# Patient Record
Sex: FEMALE | Race: WHITE | NOT HISPANIC OR LATINO
[De-identification: names, ages, dates, MRNs, and addresses within clinical notes are randomized per-mention and may not be internally consistent; named-entity substitution may affect disease eponyms.]

---

## 2017-01-18 ENCOUNTER — RX RENEWAL (OUTPATIENT)
Age: 62
End: 2017-01-18

## 2017-05-09 ENCOUNTER — MEDICATION RENEWAL (OUTPATIENT)
Age: 62
End: 2017-05-09

## 2017-06-22 ENCOUNTER — MEDICATION RENEWAL (OUTPATIENT)
Age: 62
End: 2017-06-22

## 2017-07-10 ENCOUNTER — RX RENEWAL (OUTPATIENT)
Age: 62
End: 2017-07-10

## 2017-07-20 ENCOUNTER — RX RENEWAL (OUTPATIENT)
Age: 62
End: 2017-07-20

## 2017-08-09 ENCOUNTER — APPOINTMENT (OUTPATIENT)
Dept: ENDOCRINOLOGY | Facility: CLINIC | Age: 62
End: 2017-08-09
Payer: COMMERCIAL

## 2017-08-09 VITALS
DIASTOLIC BLOOD PRESSURE: 95 MMHG | WEIGHT: 194.44 LBS | HEIGHT: 63.7 IN | SYSTOLIC BLOOD PRESSURE: 181 MMHG | BODY MASS INDEX: 33.61 KG/M2 | HEART RATE: 108 BPM

## 2017-08-09 DIAGNOSIS — E04.2 NONTOXIC MULTINODULAR GOITER: ICD-10-CM

## 2017-08-09 PROCEDURE — 99214 OFFICE O/P EST MOD 30 MIN: CPT

## 2017-08-10 LAB
ANION GAP SERPL CALC-SCNC: 22 MMOL/L
BUN SERPL-MCNC: 16 MG/DL
CALCIUM SERPL-MCNC: 9.6 MG/DL
CHLORIDE SERPL-SCNC: 93 MMOL/L
CO2 SERPL-SCNC: 22 MMOL/L
CREAT SERPL-MCNC: 0.97 MG/DL
GLUCOSE SERPL-MCNC: 233 MG/DL
HBA1C MFR BLD HPLC: 7 %
POTASSIUM SERPL-SCNC: 4.6 MMOL/L
SODIUM SERPL-SCNC: 137 MMOL/L
TSH SERPL-ACNC: 3.21 UIU/ML

## 2017-10-12 ENCOUNTER — MEDICATION RENEWAL (OUTPATIENT)
Age: 62
End: 2017-10-12

## 2017-11-15 ENCOUNTER — RX RENEWAL (OUTPATIENT)
Age: 62
End: 2017-11-15

## 2017-12-01 ENCOUNTER — MEDICATION RENEWAL (OUTPATIENT)
Age: 62
End: 2017-12-01

## 2017-12-12 ENCOUNTER — MEDICATION RENEWAL (OUTPATIENT)
Age: 62
End: 2017-12-12

## 2018-01-07 ENCOUNTER — RX RENEWAL (OUTPATIENT)
Age: 63
End: 2018-01-07

## 2018-01-11 ENCOUNTER — MEDICATION RENEWAL (OUTPATIENT)
Age: 63
End: 2018-01-11

## 2018-02-08 ENCOUNTER — RX RENEWAL (OUTPATIENT)
Age: 63
End: 2018-02-08

## 2018-04-06 ENCOUNTER — RX RENEWAL (OUTPATIENT)
Age: 63
End: 2018-04-06

## 2018-04-08 ENCOUNTER — RX RENEWAL (OUTPATIENT)
Age: 63
End: 2018-04-08

## 2018-04-11 ENCOUNTER — APPOINTMENT (OUTPATIENT)
Dept: ENDOCRINOLOGY | Facility: CLINIC | Age: 63
End: 2018-04-11
Payer: COMMERCIAL

## 2018-04-11 VITALS
HEIGHT: 64 IN | WEIGHT: 190 LBS | SYSTOLIC BLOOD PRESSURE: 160 MMHG | BODY MASS INDEX: 32.44 KG/M2 | HEART RATE: 134 BPM | DIASTOLIC BLOOD PRESSURE: 88 MMHG

## 2018-04-11 PROCEDURE — 99214 OFFICE O/P EST MOD 30 MIN: CPT

## 2018-04-11 RX ORDER — GLIPIZIDE 10 MG/1
10 TABLET ORAL DAILY
Qty: 30 | Refills: 0 | Status: DISCONTINUED | COMMUNITY
Start: 2017-08-09 | End: 2018-04-11

## 2018-04-12 LAB
ALBUMIN SERPL ELPH-MCNC: 4.4 G/DL
ALP BLD-CCNC: 69 U/L
ALT SERPL-CCNC: 17 U/L
ANION GAP SERPL CALC-SCNC: 16 MMOL/L
AST SERPL-CCNC: 19 U/L
BILIRUB SERPL-MCNC: 0.4 MG/DL
BUN SERPL-MCNC: 18 MG/DL
CALCIUM SERPL-MCNC: 10 MG/DL
CHLORIDE SERPL-SCNC: 96 MMOL/L
CHOLEST SERPL-MCNC: 160 MG/DL
CHOLEST/HDLC SERPL: 2.8 RATIO
CO2 SERPL-SCNC: 26 MMOL/L
CREAT SERPL-MCNC: 1.07 MG/DL
CREAT SPEC-SCNC: 35 MG/DL
GLUCOSE SERPL-MCNC: 196 MG/DL
HBA1C MFR BLD HPLC: 6.4 %
HDLC SERPL-MCNC: 57 MG/DL
LDLC SERPL CALC-MCNC: 83 MG/DL
MICROALBUMIN 24H UR DL<=1MG/L-MCNC: 6.5 MG/DL
MICROALBUMIN/CREAT 24H UR-RTO: 186 MG/G
POTASSIUM SERPL-SCNC: 4.4 MMOL/L
PROT SERPL-MCNC: 7.6 G/DL
SODIUM SERPL-SCNC: 138 MMOL/L
TRIGL SERPL-MCNC: 99 MG/DL
TSH SERPL-ACNC: 2.43 UIU/ML

## 2018-04-13 ENCOUNTER — MEDICATION RENEWAL (OUTPATIENT)
Age: 63
End: 2018-04-13

## 2018-04-13 RX ORDER — LIRAGLUTIDE 6 MG/ML
18 INJECTION SUBCUTANEOUS
Qty: 1 | Refills: 5 | Status: DISCONTINUED | COMMUNITY
Start: 2017-12-12 | End: 2018-04-13

## 2018-05-03 ENCOUNTER — MEDICATION RENEWAL (OUTPATIENT)
Age: 63
End: 2018-05-03

## 2018-05-29 ENCOUNTER — MEDICATION RENEWAL (OUTPATIENT)
Age: 63
End: 2018-05-29

## 2018-06-05 ENCOUNTER — TRANSCRIPTION ENCOUNTER (OUTPATIENT)
Age: 63
End: 2018-06-05

## 2018-08-10 ENCOUNTER — RX RENEWAL (OUTPATIENT)
Age: 63
End: 2018-08-10

## 2018-08-28 ENCOUNTER — TRANSCRIPTION ENCOUNTER (OUTPATIENT)
Age: 63
End: 2018-08-28

## 2018-08-28 ENCOUNTER — MED ADMIN CHARGE (OUTPATIENT)
Age: 63
End: 2018-08-28

## 2018-10-26 ENCOUNTER — MEDICATION RENEWAL (OUTPATIENT)
Age: 63
End: 2018-10-26

## 2018-11-09 ENCOUNTER — APPOINTMENT (OUTPATIENT)
Dept: ENDOCRINOLOGY | Facility: CLINIC | Age: 63
End: 2018-11-09
Payer: COMMERCIAL

## 2018-11-09 VITALS
BODY MASS INDEX: 32.27 KG/M2 | HEART RATE: 117 BPM | WEIGHT: 189 LBS | SYSTOLIC BLOOD PRESSURE: 176 MMHG | DIASTOLIC BLOOD PRESSURE: 90 MMHG | HEIGHT: 64 IN

## 2018-11-09 PROCEDURE — 99214 OFFICE O/P EST MOD 30 MIN: CPT

## 2018-11-12 LAB
ANION GAP SERPL CALC-SCNC: 16 MMOL/L
BUN SERPL-MCNC: 16 MG/DL
CALCIUM SERPL-MCNC: 10 MG/DL
CHLORIDE SERPL-SCNC: 96 MMOL/L
CO2 SERPL-SCNC: 24 MMOL/L
CREAT SERPL-MCNC: 1.01 MG/DL
GLUCOSE SERPL-MCNC: 169 MG/DL
HBA1C MFR BLD HPLC: 6.3 %
POTASSIUM SERPL-SCNC: 4.5 MMOL/L
SODIUM SERPL-SCNC: 136 MMOL/L

## 2019-02-07 ENCOUNTER — MEDICATION RENEWAL (OUTPATIENT)
Age: 64
End: 2019-02-07

## 2019-04-08 ENCOUNTER — MEDICATION RENEWAL (OUTPATIENT)
Age: 64
End: 2019-04-08

## 2019-04-18 ENCOUNTER — MEDICATION RENEWAL (OUTPATIENT)
Age: 64
End: 2019-04-18

## 2019-05-07 ENCOUNTER — MEDICATION RENEWAL (OUTPATIENT)
Age: 64
End: 2019-05-07

## 2019-05-07 ENCOUNTER — TRANSCRIPTION ENCOUNTER (OUTPATIENT)
Age: 64
End: 2019-05-07

## 2019-05-10 ENCOUNTER — APPOINTMENT (OUTPATIENT)
Dept: ENDOCRINOLOGY | Facility: CLINIC | Age: 64
End: 2019-05-10
Payer: COMMERCIAL

## 2019-05-10 VITALS
WEIGHT: 187 LBS | BODY MASS INDEX: 31.92 KG/M2 | SYSTOLIC BLOOD PRESSURE: 148 MMHG | HEIGHT: 64 IN | DIASTOLIC BLOOD PRESSURE: 89 MMHG | HEART RATE: 104 BPM

## 2019-05-10 PROCEDURE — 99214 OFFICE O/P EST MOD 30 MIN: CPT

## 2019-05-10 NOTE — ASSESSMENT
[FreeTextEntry1] : Diabetes with multiple complications (retinopathy, nephropathy, neuropathy). \par if A1c still < 6.5%, will reduce Levemir to 10 units.  continue Trulicity, Humalog, metformin. \par recommended increasing exercise which will also help to reduce insulin requirements.  offered PT referral for improving balance, but she doesn't want to go to PT\par send for baseline bone density next visit, after she turns 64.\par RTO 6 months

## 2019-05-10 NOTE — HISTORY OF PRESENT ILLNESS
[FreeTextEntry1] : fell recently in living room, tripped over rug,  landed on her L knee\par no fractures\par am glucose 105-125.  160s post meal\par no hypoglycemia recently\par always has sinus issues, but no SOB or chest pain\par no neuropathy symptoms\par planning to start exercise "later this year"--  says she is waiting for balance to improve\par up to date with ophtho\par \par PMH: DM, retinopathy s/p multiple laser procedures, injections, vitrectomy\par proteinuria\par \par Meds:\par Levemir 12 units hs\par Humalog 10 units lunch and dinner, and with breakfast if having heavy breakfast\par Trulicity 1.5mg/week, metformin 1g bid\par quinapril 20mg/day, amlodipine 10mg, metoprolol 50mg bid\par atorvastatin 20mg\par Previous meds: invokana (yeast infx), Byetta (changed to Trulicity)\par \par ALL: penicillin (itching)

## 2019-05-10 NOTE — DATA REVIEWED
[FreeTextEntry1] : 11/18: A1c 6.3%, Cr 1.01\par 4/18: A1c 6.4%, Cr 1.07, tot chol 160, trig 99, HDL 57, LDL 83, TSH 2.43, urine microalb/cr 186\par 8/17: A1c 7.0%, TSH 3.21\par 11/16: A1c 7.1%, tot chol 205, trig 136, HDL 61, , urine microalb/cr 488\par

## 2019-05-10 NOTE — PHYSICAL EXAM
[Alert] : alert [Healthy Appearance] : healthy appearance [Normal Voice/Communication] : normal voice communication [No Proptosis] : no proptosis [Normal Hearing] : hearing was normal [No Lid Lag] : no lid lag [Clear to Auscultation] : lungs were clear to auscultation bilaterally [No LAD] : no lymphadenopathy [Normal S1, S2] : normal S1 and S2 [No Edema] : there was no peripheral edema [Regular Rhythm] : with a regular rhythm [Normal Sensation on Monofilament Testing] : normal sensation on monofilament testing of lower extremities [Normal Affect] : the affect was normal [Normal Mood] : the mood was normal [Foot Ulcers] : no foot ulcers [Acanthosis Nigricans] : no acanthosis nigricans [de-identified] : thyroid palpable [de-identified] : 3/6 systolic murmur, regular tachy [de-identified] : reduced pulses [de-identified] : no hair growth on LE, (+) discoloration [de-identified] : hammertoes, bunion L foot

## 2019-05-13 LAB
ALBUMIN SERPL ELPH-MCNC: 4.8 G/DL
ALP BLD-CCNC: 76 U/L
ALT SERPL-CCNC: 20 U/L
ANION GAP SERPL CALC-SCNC: 15 MMOL/L
AST SERPL-CCNC: 19 U/L
BILIRUB SERPL-MCNC: 0.3 MG/DL
BUN SERPL-MCNC: 16 MG/DL
CALCIUM SERPL-MCNC: 10.5 MG/DL
CHLORIDE SERPL-SCNC: 94 MMOL/L
CHOLEST SERPL-MCNC: 162 MG/DL
CHOLEST/HDLC SERPL: 2.9 RATIO
CO2 SERPL-SCNC: 26 MMOL/L
CREAT SERPL-MCNC: 0.93 MG/DL
CREAT SPEC-SCNC: 50 MG/DL
ESTIMATED AVERAGE GLUCOSE: 131 MG/DL
GLUCOSE SERPL-MCNC: 171 MG/DL
HBA1C MFR BLD HPLC: 6.2 %
HDLC SERPL-MCNC: 56 MG/DL
LDLC SERPL CALC-MCNC: 87 MG/DL
MICROALBUMIN 24H UR DL<=1MG/L-MCNC: 7.3 MG/DL
MICROALBUMIN/CREAT 24H UR-RTO: 146 MG/G
POTASSIUM SERPL-SCNC: 4.7 MMOL/L
PROT SERPL-MCNC: 7.5 G/DL
SODIUM SERPL-SCNC: 135 MMOL/L
TRIGL SERPL-MCNC: 95 MG/DL
TSH SERPL-ACNC: 2.95 UIU/ML

## 2019-06-05 ENCOUNTER — MEDICATION RENEWAL (OUTPATIENT)
Age: 64
End: 2019-06-05

## 2019-06-21 ENCOUNTER — APPOINTMENT (OUTPATIENT)
Dept: INTERNAL MEDICINE | Facility: CLINIC | Age: 64
End: 2019-06-21
Payer: COMMERCIAL

## 2019-06-21 VITALS
HEART RATE: 146 BPM | DIASTOLIC BLOOD PRESSURE: 80 MMHG | OXYGEN SATURATION: 97 % | SYSTOLIC BLOOD PRESSURE: 130 MMHG | RESPIRATION RATE: 14 BRPM | TEMPERATURE: 98 F | HEIGHT: 64 IN

## 2019-06-21 DIAGNOSIS — R35.0 FREQUENCY OF MICTURITION: ICD-10-CM

## 2019-06-21 PROCEDURE — 99215 OFFICE O/P EST HI 40 MIN: CPT

## 2019-06-21 NOTE — PHYSICAL EXAM
[Well Nourished] : well nourished [No Acute Distress] : no acute distress [Well Developed] : well developed [Normal Oropharynx] : the oropharynx was normal [No Respiratory Distress] : no respiratory distress  [No Lymphadenopathy] : no lymphadenopathy [Supple] : supple [No Accessory Muscle Use] : no accessory muscle use [Clear to Auscultation] : lungs were clear to auscultation bilaterally [Normal Rate] : normal rate  [Regular Rhythm] : with a regular rhythm [Normal S1, S2] : normal S1 and S2 [No Edema] : there was no peripheral edema [Normal Gait] : normal gait [Normal Affect] : the affect was normal [Normal Insight/Judgement] : insight and judgment were intact

## 2019-06-21 NOTE — HISTORY OF PRESENT ILLNESS
[de-identified] : 64 yo f with DM, diabetic retinopathy, HTN, HLD, \par Has not been seen in 3 years\par Taking medications regularly but just ran out of metoprolol.  \par Stopped following with a cardiologist. \par Last A1C of 6.2%.  \par no mammo or colonoscopy - did not get them done.  \par Has urinary frequency and stress incontinence.  \par

## 2019-06-26 ENCOUNTER — MEDICATION RENEWAL (OUTPATIENT)
Age: 64
End: 2019-06-26

## 2019-07-01 ENCOUNTER — MEDICATION RENEWAL (OUTPATIENT)
Age: 64
End: 2019-07-01

## 2019-07-29 ENCOUNTER — TRANSCRIPTION ENCOUNTER (OUTPATIENT)
Age: 64
End: 2019-07-29

## 2019-09-16 ENCOUNTER — MEDICATION RENEWAL (OUTPATIENT)
Age: 64
End: 2019-09-16

## 2019-09-23 ENCOUNTER — MEDICATION RENEWAL (OUTPATIENT)
Age: 64
End: 2019-09-23

## 2019-11-04 ENCOUNTER — MEDICATION RENEWAL (OUTPATIENT)
Age: 64
End: 2019-11-04

## 2019-11-08 ENCOUNTER — APPOINTMENT (OUTPATIENT)
Dept: ENDOCRINOLOGY | Facility: CLINIC | Age: 64
End: 2019-11-08
Payer: COMMERCIAL

## 2019-11-08 VITALS
WEIGHT: 183 LBS | DIASTOLIC BLOOD PRESSURE: 109 MMHG | BODY MASS INDEX: 31.41 KG/M2 | SYSTOLIC BLOOD PRESSURE: 184 MMHG | HEART RATE: 109 BPM

## 2019-11-08 PROCEDURE — 99214 OFFICE O/P EST MOD 30 MIN: CPT

## 2019-11-08 NOTE — PHYSICAL EXAM
[Alert] : alert [Normal Voice/Communication] : normal voice communication [No Proptosis] : no proptosis [Healthy Appearance] : healthy appearance [Normal Hearing] : hearing was normal [No Lid Lag] : no lid lag [No LAD] : no lymphadenopathy [Normal S1, S2] : normal S1 and S2 [Clear to Auscultation] : lungs were clear to auscultation bilaterally [Regular Rhythm] : with a regular rhythm [No Edema] : there was no peripheral edema [Normal Sensation on Monofilament Testing] : normal sensation on monofilament testing of lower extremities [Normal Affect] : the affect was normal [Normal Mood] : the mood was normal [Foot Ulcers] : no foot ulcers [Acanthosis Nigricans] : no acanthosis nigricans [de-identified] : thyroid palpable [de-identified] : 3/6 systolic murmur, regular tachy [de-identified] : reduced pulses [de-identified] : hammertoes, bunion L foot [de-identified] : no hair growth on LE, (+) discoloration

## 2019-11-08 NOTE — HISTORY OF PRESENT ILLNESS
[FreeTextEntry1] : glucose 109 today am.  testing 1-3x/day\par am range usually < 120 but will be 10mg/dl higher when she wakes up later.\par no polyuria, polydipsia, SOB, chest pain, or neuropathy symptoms \par no hypoglycemia symptoms\par up to date with ophtho, saw 5 months ago\par \par PMH: DM, retinopathy s/p multiple laser procedures, injections, vitrectomy\par proteinuria\par \par Meds:\par Levemir 10 units hs\par Humalog 14 units lunch and 10 for breakfast and  dinner\par Trulicity 1.5mg/week, metformin 1g bid\par quinapril 20mg/day, amlodipine 10mg, metoprolol 50mg bid\par atorvastatin 20mg\par Previous meds: invokana (yeast infx), Byetta (changed to Trulicity)\par \par ALL: penicillin (itching)

## 2019-11-08 NOTE — ASSESSMENT
[FreeTextEntry1] : Diabetes with multiple complications (retinopathy, nephropathy, neuropathy). \par if A1c still < 6.5%, will discontinue Levemir and monitor sugars.  If morning sugars stay below 130, then can stay off Levemir.  But should restart if am glucose consistently over 130-140.\par continue Trulicity and metformin. She is changing over to Medicare, which will cover her med cost, but she may fall into "donut hole" toward end of the year.\par RTo 6 months

## 2019-11-08 NOTE — DATA REVIEWED
[FreeTextEntry1] : 5/19: a1c 6.2%, tot chol 162, trig 95, HDL 56, LDL 87, TSH 2.95, urine microalbumin/cr 146, Cr 0.93, GFR 65\par 11/18: A1c 6.3%, Cr 1.01\par 4/18: A1c 6.4%, Cr 1.07, tot chol 160, trig 99, HDL 57, LDL 83, TSH 2.43, urine microalb/cr 186\par 8/17: A1c 7.0%, TSH 3.21\par 11/16: A1c 7.1%, tot chol 205, trig 136, HDL 61, , urine microalb/cr 488\par \par thyroid sono, 11/14:\par heterogeneous with bilateral sub-cm nodules (3-6mm)

## 2019-11-11 LAB
ANION GAP SERPL CALC-SCNC: 19 MMOL/L
BUN SERPL-MCNC: 16 MG/DL
CALCIUM SERPL-MCNC: 10.2 MG/DL
CHLORIDE SERPL-SCNC: 95 MMOL/L
CO2 SERPL-SCNC: 20 MMOL/L
CREAT SERPL-MCNC: 0.91 MG/DL
ESTIMATED AVERAGE GLUCOSE: 117 MG/DL
GLUCOSE SERPL-MCNC: 156 MG/DL
HBA1C MFR BLD HPLC: 5.7 %
POTASSIUM SERPL-SCNC: 4.5 MMOL/L
SODIUM SERPL-SCNC: 134 MMOL/L

## 2019-12-30 ENCOUNTER — TRANSCRIPTION ENCOUNTER (OUTPATIENT)
Age: 64
End: 2019-12-30

## 2020-01-29 ENCOUNTER — TRANSCRIPTION ENCOUNTER (OUTPATIENT)
Age: 65
End: 2020-01-29

## 2020-02-10 ENCOUNTER — RX RENEWAL (OUTPATIENT)
Age: 65
End: 2020-02-10

## 2020-02-13 ENCOUNTER — RX RENEWAL (OUTPATIENT)
Age: 65
End: 2020-02-13

## 2020-02-26 ENCOUNTER — TRANSCRIPTION ENCOUNTER (OUTPATIENT)
Age: 65
End: 2020-02-26

## 2020-05-04 ENCOUNTER — APPOINTMENT (OUTPATIENT)
Dept: ENDOCRINOLOGY | Facility: CLINIC | Age: 65
End: 2020-05-04
Payer: MEDICARE

## 2020-05-04 PROCEDURE — 99214 OFFICE O/P EST MOD 30 MIN: CPT | Mod: 95

## 2020-05-05 NOTE — ASSESSMENT
[FreeTextEntry1] : Diabetes with multiple complications (retinopathy, nephropathy, neuropathy). \par continue current regimen.  Higher morning sugars likely due to annie effect, which is probably peaking later in the morning than average (which is why sugars are higher when she wakes up later).  Sugars are still not very high, so I don't think she needs to purposely wake up earlier to "thwart" the increase in glucose.\par Encouraged more physical activity and not be sedentary at home.  Suggested doing 10 min of exercise every few hours.\par She anticipates she will reach the medicare gap later this year, and if Trulicity is too expensive for her, she can do without for a while, but she may need to increase Humalog dose. She may also gain weight since Trulicity tends to reduce appetite, and also taking more insulin can cause weight gain.\par will skip labs this time, A1c has been below goal.\par continue statin therapy\par RTO 4-6 months

## 2020-05-05 NOTE — DATA REVIEWED
[FreeTextEntry1] : 11/19: A1c 5.7%, Cr 0.91, GFR 67\par 5/19: a1c 6.2%, tot chol 162, trig 95, HDL 56, LDL 87, TSH 2.95, urine microalbumin/cr 146, Cr 0.93, GFR 65\par 11/18: A1c 6.3%, Cr 1.01\par 4/18: A1c 6.4%, Cr 1.07, tot chol 160, trig 99, HDL 57, LDL 83, TSH 2.43, urine microalb/cr 186\par 8/17: A1c 7.0%, TSH 3.21\par 11/16: A1c 7.1%, tot chol 205, trig 136, HDL 61, , urine microalb/cr 488\par \par thyroid sono, 11/14:\par heterogeneous with bilateral sub-cm nodules (3-6mm)

## 2020-05-05 NOTE — HISTORY OF PRESENT ILLNESS
[Home] : at home, [unfilled] , at the time of the visit. [Medical Office: ([facility])___] : at the medical office located in  [Patient] : the patient [Self] : self [FreeTextEntry1] : sugars in the morning a little higher since stopping Levemir, but not over 130-135 range\par most sugars ranging 100-140 range\par She notices that when she wakes up later, sugars are higher.  When she wakes up earlier, she eats breakfast earlier and takes her Humalog earlier and sugars don't go as high.  But she has been waking up later due to being home.\par paying more attention to the foods she is eating\par got a pedaler so she can do some exercise at home and [place] that she can walk to\par weight at home is 183 lb\par no polyuria, polydipsia, SOB, chest pain, or neuropathy symptoms \par no hypoglycemia symptoms\par up to date with ophtho, saw in Feb.\par \par PMH: DM, retinopathy s/p multiple laser procedures, injections, vitrectomy\par proteinuria\par \par Meds:\par Humalog 14 units lunch and 10 for breakfast and  dinner\par Trulicity 1.5mg/week, metformin 1g bid\par quinapril 20mg/day, amlodipine 10mg, metoprolol 50mg bid\par atorvastatin 20mg\par Previous meds: invokana (yeast infx), Byetta (changed to Trulicity), Levemir (stopped for low A1c)\par \par ALL: penicillin (itching)

## 2020-06-19 ENCOUNTER — TRANSCRIPTION ENCOUNTER (OUTPATIENT)
Age: 65
End: 2020-06-19

## 2020-09-08 ENCOUNTER — RX RENEWAL (OUTPATIENT)
Age: 65
End: 2020-09-08

## 2020-09-18 ENCOUNTER — TRANSCRIPTION ENCOUNTER (OUTPATIENT)
Age: 65
End: 2020-09-18

## 2020-10-25 ENCOUNTER — RX RENEWAL (OUTPATIENT)
Age: 65
End: 2020-10-25

## 2020-11-20 ENCOUNTER — RX RENEWAL (OUTPATIENT)
Age: 65
End: 2020-11-20

## 2021-01-21 ENCOUNTER — RX RENEWAL (OUTPATIENT)
Age: 66
End: 2021-01-21

## 2021-02-12 ENCOUNTER — RX RENEWAL (OUTPATIENT)
Age: 66
End: 2021-02-12

## 2021-03-29 ENCOUNTER — RX RENEWAL (OUTPATIENT)
Age: 66
End: 2021-03-29

## 2021-04-06 ENCOUNTER — APPOINTMENT (OUTPATIENT)
Dept: INTERNAL MEDICINE | Facility: CLINIC | Age: 66
End: 2021-04-06
Payer: MEDICARE

## 2021-04-06 VITALS
TEMPERATURE: 98.2 F | WEIGHT: 180 LBS | RESPIRATION RATE: 14 BRPM | HEIGHT: 64 IN | OXYGEN SATURATION: 97 % | HEART RATE: 128 BPM | SYSTOLIC BLOOD PRESSURE: 140 MMHG | BODY MASS INDEX: 30.73 KG/M2 | DIASTOLIC BLOOD PRESSURE: 80 MMHG

## 2021-04-06 DIAGNOSIS — I10 ESSENTIAL (PRIMARY) HYPERTENSION: ICD-10-CM

## 2021-04-06 DIAGNOSIS — E78.5 HYPERLIPIDEMIA, UNSPECIFIED: ICD-10-CM

## 2021-04-06 PROCEDURE — 99214 OFFICE O/P EST MOD 30 MIN: CPT

## 2021-04-06 PROCEDURE — 99072 ADDL SUPL MATRL&STAF TM PHE: CPT

## 2021-04-06 PROCEDURE — 36415 COLL VENOUS BLD VENIPUNCTURE: CPT

## 2021-04-06 NOTE — PHYSICAL EXAM
[Normal] : no respiratory distress, lungs were clear to auscultation bilaterally and no accessory muscle use [de-identified] : blowing systlolic murmur

## 2021-04-06 NOTE — PLAN
[FreeTextEntry1] : REVIEWED NEED FOR MORE FREQUENT FOLLOW UP\par HTN - continue current regimen\par HLD- continue statin\par Blowing systolic murmur - send for ECHO\par DM- check labs\par - f/up Dr Maza

## 2021-04-06 NOTE — HISTORY OF PRESENT ILLNESS
[de-identified] : 64 yo f with DM, diabetic retinopathy, HTN, HLD, \par Has not been seen in almost 2 years\par s/p covid vaccine x2.\par no mammo or colonoscopy - did not get them done.  \par Oxybutinin has helped her urinary issues\par COloguard 7/19 negative\par ophtho - this year\par

## 2021-04-07 ENCOUNTER — RX RENEWAL (OUTPATIENT)
Age: 66
End: 2021-04-07

## 2021-04-07 LAB
ALBUMIN SERPL ELPH-MCNC: 4.5 G/DL
ALP BLD-CCNC: 84 U/L
ALT SERPL-CCNC: 17 U/L
ANION GAP SERPL CALC-SCNC: 15 MMOL/L
APPEARANCE: CLEAR
AST SERPL-CCNC: 21 U/L
BASOPHILS # BLD AUTO: 0.05 K/UL
BASOPHILS NFR BLD AUTO: 0.7 %
BILIRUB SERPL-MCNC: 0.2 MG/DL
BILIRUBIN URINE: NEGATIVE
BLOOD URINE: NEGATIVE
BUN SERPL-MCNC: 17 MG/DL
CALCIUM SERPL-MCNC: 9.9 MG/DL
CHLORIDE SERPL-SCNC: 96 MMOL/L
CHOLEST SERPL-MCNC: 158 MG/DL
CO2 SERPL-SCNC: 23 MMOL/L
COLOR: NORMAL
CREAT SERPL-MCNC: 0.91 MG/DL
EOSINOPHIL # BLD AUTO: 0.24 K/UL
EOSINOPHIL NFR BLD AUTO: 3.5 %
ESTIMATED AVERAGE GLUCOSE: 163 MG/DL
GLUCOSE QUALITATIVE U: NEGATIVE
GLUCOSE SERPL-MCNC: 192 MG/DL
HBA1C MFR BLD HPLC: 7.3 %
HCT VFR BLD CALC: 36.8 %
HDLC SERPL-MCNC: 55 MG/DL
HGB BLD-MCNC: 12.1 G/DL
IMM GRANULOCYTES NFR BLD AUTO: 0.1 %
KETONES URINE: NEGATIVE
LDLC SERPL CALC-MCNC: 85 MG/DL
LEUKOCYTE ESTERASE URINE: NEGATIVE
LYMPHOCYTES # BLD AUTO: 1.33 K/UL
LYMPHOCYTES NFR BLD AUTO: 19.5 %
MAN DIFF?: NORMAL
MCHC RBC-ENTMCNC: 29.2 PG
MCHC RBC-ENTMCNC: 32.9 GM/DL
MCV RBC AUTO: 88.9 FL
MONOCYTES # BLD AUTO: 0.55 K/UL
MONOCYTES NFR BLD AUTO: 8.1 %
NEUTROPHILS # BLD AUTO: 4.65 K/UL
NEUTROPHILS NFR BLD AUTO: 68.1 %
NITRITE URINE: NEGATIVE
NONHDLC SERPL-MCNC: 103 MG/DL
PH URINE: 7
PLATELET # BLD AUTO: 304 K/UL
POTASSIUM SERPL-SCNC: 4.4 MMOL/L
PROT SERPL-MCNC: 7 G/DL
PROTEIN URINE: ABNORMAL
RBC # BLD: 4.14 M/UL
RBC # FLD: 13.2 %
SODIUM SERPL-SCNC: 134 MMOL/L
SPECIFIC GRAVITY URINE: 1.01
TRIGL SERPL-MCNC: 89 MG/DL
TSH SERPL-ACNC: 2.59 UIU/ML
UROBILINOGEN URINE: NORMAL
WBC # FLD AUTO: 6.83 K/UL

## 2021-05-03 ENCOUNTER — APPOINTMENT (OUTPATIENT)
Dept: HEART AND VASCULAR | Facility: CLINIC | Age: 66
End: 2021-05-03

## 2021-05-24 ENCOUNTER — RX RENEWAL (OUTPATIENT)
Age: 66
End: 2021-05-24

## 2021-05-25 ENCOUNTER — RX RENEWAL (OUTPATIENT)
Age: 66
End: 2021-05-25

## 2021-06-18 ENCOUNTER — RX RENEWAL (OUTPATIENT)
Age: 66
End: 2021-06-18

## 2021-09-13 ENCOUNTER — RX RENEWAL (OUTPATIENT)
Age: 66
End: 2021-09-13

## 2021-09-16 ENCOUNTER — RX RENEWAL (OUTPATIENT)
Age: 66
End: 2021-09-16

## 2021-10-06 ENCOUNTER — RX RENEWAL (OUTPATIENT)
Age: 66
End: 2021-10-06

## 2021-10-25 ENCOUNTER — APPOINTMENT (OUTPATIENT)
Dept: ENDOCRINOLOGY | Facility: CLINIC | Age: 66
End: 2021-10-25
Payer: MEDICARE

## 2021-10-25 VITALS — BODY MASS INDEX: 31.58 KG/M2 | WEIGHT: 184 LBS

## 2021-10-25 PROCEDURE — 99214 OFFICE O/P EST MOD 30 MIN: CPT | Mod: 95

## 2021-10-25 NOTE — DATA REVIEWED
[FreeTextEntry1] : 4/21: A1c 7.3%, tot chol 158, trig 89, HDL 55, LDL 85, GFR 66, TSH 2.59\par 11/19: A1c 5.7%, Cr 0.91, GFR 67\par 5/19: a1c 6.2%, tot chol 162, trig 95, HDL 56, LDL 87, TSH 2.95, urine microalbumin/cr 146, Cr 0.93, GFR 65\par 11/18: A1c 6.3%, Cr 1.01\par 4/18: A1c 6.4%, Cr 1.07, tot chol 160, trig 99, HDL 57, LDL 83, TSH 2.43, urine microalb/cr 186\par 8/17: A1c 7.0%, TSH 3.21\par 11/16: A1c 7.1%, tot chol 205, trig 136, HDL 61, , urine microalb/cr 488\par \par thyroid sono, 11/14:\par heterogeneous with bilateral sub-cm nodules (3-6mm)

## 2021-10-25 NOTE — ASSESSMENT
[FreeTextEntry1] : Diabetes with multiple complications (retinopathy, nephropathy, neuropathy). Obesity BMI 31\par Continue current regimen.  Offered to try increasing Trulicity to 3mg/week to get more appetite suppressing effect, or adding bupropion to reduce food cravings/emotional eating.  She doesn't want to do either of this right now, but will consider in the future.\par Also suggested using Mariam CGM to facilitate frequently glucose testing, but she is ok continuing with fingerstick testing.\par Will email [facility] care application (to reduce cost of Trulicity)  and [facility] form to do labs near home\par continue statin therapy.\par RTO 6 months

## 2021-10-25 NOTE — REASON FOR VISIT
[Home] : at home, [unfilled] , at the time of the visit. [Medical Office: ([facility])___] : at the medical office located in  [Verbal consent obtained from patient] : the patient, [unfilled] [Follow - Up] : a follow-up visit [DM Type 2] : DM Type 2

## 2021-10-25 NOTE — HISTORY OF PRESENT ILLNESS
[FreeTextEntry1] : sugars mostly controlled.  today was 110 in the morning. \par Sugars go up during the day but she keeps them under 200.\par having increased cramping in leg, vanessa on the left side\par drinks a lot of water\par exercising " a very small amount".  has stepper at home, walks outside some.\par weight at home is 184 lb\par no polyuria, polydipsia, SOB, chest pain, or neuropathy symptoms \par saw ophtho 2 months ago, no eye problems\par currently having dental implants placed (not done yet)\par Trulicity cost is very high\par \par PMH: DM, retinopathy s/p multiple laser procedures, injections, vitrectomy\par proteinuria\par \par Meds:\par Humalog  12-14-12 units TID with meals\par Trulicity 1.5mg/week, metformin 1g bid\par quinapril 20mg/day, amlodipine 10mg, metoprolol 50mg bid\par atorvastatin 20mg\par Previous meds: invokana (yeast infx), Byetta (changed to Trulicity), Levemir (stopped for low A1c)\par \par ALL: penicillin (itching)

## 2021-12-06 ENCOUNTER — RX RENEWAL (OUTPATIENT)
Age: 66
End: 2021-12-06

## 2022-03-13 ENCOUNTER — RX RENEWAL (OUTPATIENT)
Age: 67
End: 2022-03-13

## 2022-03-24 ENCOUNTER — RX RENEWAL (OUTPATIENT)
Age: 67
End: 2022-03-24

## 2022-05-30 ENCOUNTER — RX RENEWAL (OUTPATIENT)
Age: 67
End: 2022-05-30

## 2022-07-11 ENCOUNTER — RX RENEWAL (OUTPATIENT)
Age: 67
End: 2022-07-11

## 2022-09-09 ENCOUNTER — TRANSCRIPTION ENCOUNTER (OUTPATIENT)
Age: 67
End: 2022-09-09

## 2022-09-12 ENCOUNTER — RX RENEWAL (OUTPATIENT)
Age: 67
End: 2022-09-12

## 2022-10-04 ENCOUNTER — APPOINTMENT (OUTPATIENT)
Dept: ENDOCRINOLOGY | Facility: CLINIC | Age: 67
End: 2022-10-04

## 2022-10-04 ENCOUNTER — RESULT CHARGE (OUTPATIENT)
Age: 67
End: 2022-10-04

## 2022-10-04 VITALS
BODY MASS INDEX: 31.58 KG/M2 | SYSTOLIC BLOOD PRESSURE: 143 MMHG | WEIGHT: 184 LBS | HEART RATE: 108 BPM | DIASTOLIC BLOOD PRESSURE: 81 MMHG

## 2022-10-04 LAB
GLUCOSE BLDC GLUCOMTR-MCNC: 303
HBA1C MFR BLD HPLC: 7.9

## 2022-10-04 PROCEDURE — 99214 OFFICE O/P EST MOD 30 MIN: CPT | Mod: 25

## 2022-10-04 PROCEDURE — 83036 HEMOGLOBIN GLYCOSYLATED A1C: CPT | Mod: QW

## 2022-10-04 NOTE — DATA REVIEWED
[FreeTextEntry1] : 10/22 A1c 7.9% POC\par 11/21 A1c 7.3%, tot chol 161, trig 123, HDL 48, LDL 91, B12 657, urine alb/cr 230\par 4/21: A1c 7.3%, tot chol 158, trig 89, HDL 55, LDL 85, GFR 66, TSH 2.59\par 11/19: A1c 5.7%, Cr 0.91, GFR 67\par 5/19: a1c 6.2%, tot chol 162, trig 95, HDL 56, LDL 87, TSH 2.95, urine microalbumin/cr 146, Cr 0.93, GFR 65\par 11/18: A1c 6.3%, Cr 1.01\par 4/18: A1c 6.4%, Cr 1.07, tot chol 160, trig 99, HDL 57, LDL 83, TSH 2.43, urine microalb/cr 186\par 8/17: A1c 7.0%, TSH 3.21\par 11/16: A1c 7.1%, tot chol 205, trig 136, HDL 61, , urine microalb/cr 488\par \par thyroid sono, 11/14:\par heterogeneous with bilateral sub-cm nodules (3-6mm)

## 2022-10-04 NOTE — HISTORY OF PRESENT ILLNESS
[FreeTextEntry1] : sugars  high for the past 2 weeks, over 200 in the mornings.   did not bring meter.\par 150-220 pre dinner.  She has been eating late (dinner at 11p) and waking up late.\par She feels off balance today and is using our office wheelchair to move from waiting room to my office.\par no  SOB, chest pain, or neuropathy symptoms \par has appointment to see ophtho next month\par will see PCP next month, will get labs and flu vaccine then\par \par PMH: DM, retinopathy s/p multiple laser procedures, injections, vitrectomy\par proteinuria\par \par Meds:\par Humalog 10-14 units TID with meals\par Trulicity 1.5mg/week, metformin 1g bid\par quinapril 20mg/day, amlodipine 10mg, metoprolol 50mg bid\par atorvastatin 20mg\par Previous meds: invokana (yeast infx), Byetta (changed to Trulicity), Levemir (stopped for low A1c)\par \par ALL: penicillin (itching)

## 2022-10-04 NOTE — PHYSICAL EXAM
[Alert] : alert [No Acute Distress] : no acute distress [No Proptosis] : no proptosis [No Lid Lag] : no lid lag [Normal Hearing] : hearing was normal [No LAD] : no lymphadenopathy [Thyroid Not Enlarged] : the thyroid was not enlarged [Clear to Auscultation] : lungs were clear to auscultation bilaterally [Normal S1, S2] : normal S1 and S2 [Regular Rhythm] : with a regular rhythm [No Edema] : no peripheral edema [Normal Sensation on Monofilament Testing] : normal sensation on monofilament testing of lower extremities [Normal Affect] : the affect was normal [Normal Mood] : the mood was normal [Acanthosis Nigricans] : no acanthosis nigricans [Foot Ulcers] : no foot ulcers [de-identified] : fingerstick 303 [de-identified] : reg tachy, systolic murmur [de-identified] : reduced pedal pulses

## 2022-10-04 NOTE — ASSESSMENT
[FreeTextEntry1] : Diabetes with multiple complications (retinopathy, nephropathy, neuropathy). Not controlled.  Obesity BMI 31\par Titrate Trulicity to 3mg/week dose, ok to wait until current supply is finished.\par continue Humalog for meals.   I recommended she take a correction dose at bedtime if glucose is over 200 so that sugars are not high all night:   201-250, 2 units Humalog; 251-300, 3 units Humalog; over 301, 4 units Humalog.\par Suggested skipping breakfast if she wakes up late (just go into eating lunch)\par continue statin therapy.\par RTO 6 months

## 2022-10-21 ENCOUNTER — TRANSCRIPTION ENCOUNTER (OUTPATIENT)
Age: 67
End: 2022-10-21

## 2022-11-04 ENCOUNTER — RX RENEWAL (OUTPATIENT)
Age: 67
End: 2022-11-04

## 2022-11-09 ENCOUNTER — TRANSCRIPTION ENCOUNTER (OUTPATIENT)
Age: 67
End: 2022-11-09

## 2022-11-22 ENCOUNTER — RX RENEWAL (OUTPATIENT)
Age: 67
End: 2022-11-22

## 2022-12-15 ENCOUNTER — RX RENEWAL (OUTPATIENT)
Age: 67
End: 2022-12-15

## 2022-12-28 ENCOUNTER — APPOINTMENT (OUTPATIENT)
Dept: ENDOCRINOLOGY | Facility: CLINIC | Age: 67
End: 2022-12-28

## 2023-02-01 ENCOUNTER — RX RENEWAL (OUTPATIENT)
Age: 68
End: 2023-02-01

## 2023-02-09 ENCOUNTER — TRANSCRIPTION ENCOUNTER (OUTPATIENT)
Age: 68
End: 2023-02-09

## 2023-02-21 ENCOUNTER — RX RENEWAL (OUTPATIENT)
Age: 68
End: 2023-02-21

## 2023-03-28 ENCOUNTER — RX RENEWAL (OUTPATIENT)
Age: 68
End: 2023-03-28

## 2023-03-28 RX ORDER — OXYBUTYNIN CHLORIDE 5 MG/1
5 TABLET ORAL
Qty: 90 | Refills: 3 | Status: ACTIVE | COMMUNITY
Start: 2019-06-21 | End: 1900-01-01

## 2023-03-28 RX ORDER — PEN NEEDLE, DIABETIC 32GX 5/32"
32G X 4 MM NEEDLE, DISPOSABLE MISCELLANEOUS
Qty: 360 | Refills: 1 | Status: ACTIVE | COMMUNITY
Start: 2020-02-10 | End: 1900-01-01

## 2023-03-28 RX ORDER — DULAGLUTIDE 3 MG/.5ML
3 INJECTION, SOLUTION SUBCUTANEOUS
Qty: 6 | Refills: 1 | Status: ACTIVE | COMMUNITY
Start: 2018-04-13 | End: 1900-01-01

## 2023-03-30 ENCOUNTER — TRANSCRIPTION ENCOUNTER (OUTPATIENT)
Age: 68
End: 2023-03-30

## 2023-04-03 ENCOUNTER — TRANSCRIPTION ENCOUNTER (OUTPATIENT)
Age: 68
End: 2023-04-03

## 2023-04-03 ENCOUNTER — APPOINTMENT (OUTPATIENT)
Dept: INTERNAL MEDICINE | Facility: CLINIC | Age: 68
End: 2023-04-03
Payer: MEDICARE

## 2023-04-03 VITALS
BODY MASS INDEX: 30.73 KG/M2 | HEART RATE: 106 BPM | OXYGEN SATURATION: 97 % | SYSTOLIC BLOOD PRESSURE: 136 MMHG | TEMPERATURE: 97.2 F | DIASTOLIC BLOOD PRESSURE: 80 MMHG | WEIGHT: 180 LBS | HEIGHT: 64 IN | RESPIRATION RATE: 14 BRPM

## 2023-04-03 DIAGNOSIS — Z00.00 ENCOUNTER FOR GENERAL ADULT MEDICAL EXAMINATION W/OUT ABNORMAL FINDINGS: ICD-10-CM

## 2023-04-03 DIAGNOSIS — E11.9 TYPE 2 DIABETES MELLITUS W/OUT COMPLICATIONS: ICD-10-CM

## 2023-04-03 DIAGNOSIS — Z23 ENCOUNTER FOR IMMUNIZATION: ICD-10-CM

## 2023-04-03 DIAGNOSIS — R26.81 UNSTEADINESS ON FEET: ICD-10-CM

## 2023-04-03 PROCEDURE — G0439: CPT

## 2023-04-03 PROCEDURE — 90677 PCV20 VACCINE IM: CPT

## 2023-04-03 PROCEDURE — 36415 COLL VENOUS BLD VENIPUNCTURE: CPT

## 2023-04-03 PROCEDURE — G0009: CPT

## 2023-04-03 NOTE — PLAN
[FreeTextEntry1] : Ankle stiffness-  ortho referral and exerciss reviewed\par HTN - continue current regimen\par HLD- continue statin\par Blowing systolic murmur - send for ECHO\par DM- check labs\par - f/up Dr Maza\par gait instability send for PT\par mammo\par dexa\par prevnar 20 today\par rec shingrix

## 2023-04-03 NOTE — HISTORY OF PRESENT ILLNESS
[de-identified] : 68 yo f with DM, diabetic retinopathy, HTN, HLD, \par - gait instability, walking with a cane\par \par no mammo or colonoscopy - did not get them done.  \par Oxybutinin has helped her urinary issues\par COloguard 10/22 negative\par ophtho - this year\par

## 2023-04-03 NOTE — REVIEW OF SYSTEMS
[Vision Problems] : vision problems [Nocturia] : nocturia [Frequency] : frequency [Joint Pain] : joint pain [Joint Stiffness] : joint stiffness [Memory Loss] : memory loss [Unsteady Walking] : ataxia [Negative] : Psychiatric [Headache] : no headache

## 2023-04-03 NOTE — PHYSICAL EXAM
[Normal] : no respiratory distress, lungs were clear to auscultation bilaterally and no accessory muscle use [de-identified] : blowing systlolic murmur [de-identified] : dec rom both ankles

## 2023-04-04 ENCOUNTER — TRANSCRIPTION ENCOUNTER (OUTPATIENT)
Age: 68
End: 2023-04-04

## 2023-04-04 ENCOUNTER — NON-APPOINTMENT (OUTPATIENT)
Age: 68
End: 2023-04-04

## 2023-04-04 LAB
ALBUMIN SERPL ELPH-MCNC: 4.6 G/DL
ALP BLD-CCNC: 72 U/L
ALT SERPL-CCNC: 15 U/L
ANION GAP SERPL CALC-SCNC: 16 MMOL/L
AST SERPL-CCNC: 17 U/L
BASOPHILS # BLD AUTO: 0.05 K/UL
BASOPHILS NFR BLD AUTO: 0.6 %
BILIRUB SERPL-MCNC: 0.4 MG/DL
BUN SERPL-MCNC: 21 MG/DL
CALCIUM SERPL-MCNC: 10.4 MG/DL
CHLORIDE SERPL-SCNC: 92 MMOL/L
CHOLEST SERPL-MCNC: 163 MG/DL
CO2 SERPL-SCNC: 23 MMOL/L
CREAT SERPL-MCNC: 1.03 MG/DL
EGFR: 59 ML/MIN/1.73M2
EOSINOPHIL # BLD AUTO: 0.19 K/UL
EOSINOPHIL NFR BLD AUTO: 2.1 %
ESTIMATED AVERAGE GLUCOSE: 206 MG/DL
GLUCOSE SERPL-MCNC: 239 MG/DL
HBA1C MFR BLD HPLC: 8.8 %
HCT VFR BLD CALC: 40.2 %
HDLC SERPL-MCNC: 48 MG/DL
HGB BLD-MCNC: 12.9 G/DL
IMM GRANULOCYTES NFR BLD AUTO: 0.2 %
LDLC SERPL CALC-MCNC: 92 MG/DL
LYMPHOCYTES # BLD AUTO: 1.46 K/UL
LYMPHOCYTES NFR BLD AUTO: 16.2 %
MAN DIFF?: NORMAL
MCHC RBC-ENTMCNC: 29.7 PG
MCHC RBC-ENTMCNC: 32.1 GM/DL
MCV RBC AUTO: 92.4 FL
MONOCYTES # BLD AUTO: 0.6 K/UL
MONOCYTES NFR BLD AUTO: 6.7 %
NEUTROPHILS # BLD AUTO: 6.69 K/UL
NEUTROPHILS NFR BLD AUTO: 74.2 %
NONHDLC SERPL-MCNC: 115 MG/DL
PLATELET # BLD AUTO: 274 K/UL
POTASSIUM SERPL-SCNC: 5.2 MMOL/L
PROT SERPL-MCNC: 7 G/DL
RBC # BLD: 4.35 M/UL
RBC # FLD: 13.2 %
SODIUM SERPL-SCNC: 131 MMOL/L
TRIGL SERPL-MCNC: 118 MG/DL
TSH SERPL-ACNC: 3.85 UIU/ML
VIT B12 SERPL-MCNC: 620 PG/ML
WBC # FLD AUTO: 9.01 K/UL

## 2023-04-06 ENCOUNTER — TRANSCRIPTION ENCOUNTER (OUTPATIENT)
Age: 68
End: 2023-04-06

## 2023-04-07 ENCOUNTER — TRANSCRIPTION ENCOUNTER (OUTPATIENT)
Age: 68
End: 2023-04-07

## 2023-04-14 ENCOUNTER — TRANSCRIPTION ENCOUNTER (OUTPATIENT)
Age: 68
End: 2023-04-14

## 2023-05-04 ENCOUNTER — RX RENEWAL (OUTPATIENT)
Age: 68
End: 2023-05-04

## 2023-06-22 ENCOUNTER — RX RENEWAL (OUTPATIENT)
Age: 68
End: 2023-06-22

## 2023-06-22 RX ORDER — LISINOPRIL 40 MG/1
40 TABLET ORAL DAILY
Qty: 100 | Refills: 0 | Status: ACTIVE | COMMUNITY
Start: 2023-04-14 | End: 1900-01-01

## 2023-08-09 ENCOUNTER — TRANSCRIPTION ENCOUNTER (OUTPATIENT)
Age: 68
End: 2023-08-09

## 2023-08-09 DIAGNOSIS — H10.9 UNSPECIFIED CONJUNCTIVITIS: ICD-10-CM

## 2023-08-14 ENCOUNTER — TRANSCRIPTION ENCOUNTER (OUTPATIENT)
Age: 68
End: 2023-08-14

## 2023-08-14 RX ORDER — ERYTHROMYCIN 5 MG/G
5 OINTMENT OPHTHALMIC
Qty: 5 | Refills: 0 | Status: ACTIVE | COMMUNITY
Start: 2023-08-09 | End: 1900-01-01

## 2023-09-12 ENCOUNTER — NON-APPOINTMENT (OUTPATIENT)
Age: 68
End: 2023-09-12

## 2023-09-13 ENCOUNTER — RX RENEWAL (OUTPATIENT)
Age: 68
End: 2023-09-13